# Patient Record
Sex: MALE | Race: WHITE | URBAN - METROPOLITAN AREA
[De-identification: names, ages, dates, MRNs, and addresses within clinical notes are randomized per-mention and may not be internally consistent; named-entity substitution may affect disease eponyms.]

---

## 2023-06-14 ENCOUNTER — OFFICE VISIT (OUTPATIENT)
Dept: URBAN - METROPOLITAN AREA CLINIC 54 | Facility: CLINIC | Age: 23
End: 2023-06-14
Payer: COMMERCIAL

## 2023-06-14 ENCOUNTER — WEB ENCOUNTER (OUTPATIENT)
Dept: URBAN - METROPOLITAN AREA CLINIC 54 | Facility: CLINIC | Age: 23
End: 2023-06-14

## 2023-06-14 VITALS
DIASTOLIC BLOOD PRESSURE: 91 MMHG | HEART RATE: 108 BPM | BODY MASS INDEX: 34.93 KG/M2 | HEIGHT: 74 IN | TEMPERATURE: 97.6 F | SYSTOLIC BLOOD PRESSURE: 141 MMHG | WEIGHT: 272.2 LBS

## 2023-06-14 DIAGNOSIS — R10.32 ABDOMINAL CRAMPING IN LEFT LOWER QUADRANT: ICD-10-CM

## 2023-06-14 DIAGNOSIS — R10.12 ABDOMINAL BURNING SENSATION IN LEFT UPPER QUADRANT: ICD-10-CM

## 2023-06-14 DIAGNOSIS — R74.8 ABNORMAL LIVER ENZYMES: ICD-10-CM

## 2023-06-14 DIAGNOSIS — R19.7 ACUTE DIARRHEA: ICD-10-CM

## 2023-06-14 PROCEDURE — 99204 OFFICE O/P NEW MOD 45 MIN: CPT | Performed by: INTERNAL MEDICINE

## 2023-06-14 PROCEDURE — 99244 OFF/OP CNSLTJ NEW/EST MOD 40: CPT | Performed by: INTERNAL MEDICINE

## 2023-06-14 RX ORDER — COLESTIPOL HYDROCHLORIDE 1 G/1
2 TABLETS TABLET, FILM COATED ORAL ONCE A DAY
Qty: 60 | Refills: 2 | OUTPATIENT
Start: 2023-06-14

## 2023-06-14 RX ORDER — CHOLESTYRAMINE 4 G/9G
1 PACKET MIXED WITH WATER OR NON-CARBONATED DRINK POWDER, FOR SUSPENSION ORAL ONCE A DAY
OUTPATIENT

## 2023-06-14 RX ORDER — CHOLESTYRAMINE 4 G/9G
1 PACKET MIXED WITH WATER OR NON-CARBONATED DRINK POWDER, FOR SUSPENSION ORAL ONCE A DAY
Status: ACTIVE | COMMUNITY

## 2023-06-14 RX ORDER — AMLODIPINE BESYLATE 5 MG/1
1 TABLET TABLET ORAL ONCE A DAY
Status: ACTIVE | COMMUNITY

## 2023-06-14 RX ORDER — ALLOPURINOL 100 MG/1
1 TABLET TABLET ORAL ONCE A DAY
Status: ACTIVE | COMMUNITY

## 2023-06-14 RX ORDER — DICYCLOMINE HYDROCHLORIDE 10 MG/1
2 CAPSULES CAPSULE ORAL THREE TIMES A DAY
Status: ACTIVE | COMMUNITY

## 2023-06-14 NOTE — HPI-TODAY'S VISIT:
6/14/23: Patient was referred by SUDEEP Ny for IBS-M. A copy of this document will be sent to the provider. Pt is a 23 yo male with PMH of HTN, asthma, gout, and IBS who reports several years of intermittent abd pain and diarrhea that has worsened to daily symptoms over the last 6 months. Having 6-7 loose BMs daily. Occasionaly will see minimal BRBPR on the toilet paper. No melena or mucus. Reports intermittent, sharp lower abdominal pain that lasts a few hours at a time, occurring about once a week or so. Usually worsens with defecation. Becomes food avoidant when having pain. Usually Pepto Bismol helps. Prescribed Questran but could not tolerate the taste. No nausea, vomiting, or unintentional weight loss. Mother has celiac disease. No family hx of IBD or CRC. No prior EGD, cscope, or other GI workup. Recent labs showed elevated WBC count at 11.1, TB 1.4, and ALT 73, with AST 32. No imaging.

## 2023-06-15 LAB
ALBUMIN/GLOBULIN RATIO: 1.7
ALBUMIN: 5
ALKALINE PHOSPHATASE: 53
ALT: 59
AST: 28
BILIRUBIN, DIRECT: 0.1
BILIRUBIN, INDIRECT: 0.3
BILIRUBIN, TOTAL: 0.4
C-REACTIVE PROTEIN, QUANT: 5
FIB 4 INDEX: 0.23
FIB 4 INTERPRETATION: (no result)
GLOBULIN: 2.9
IMMUNOGLOBULIN A, QN, SERUM: 165
PLATELET COUNT: 369
PROTEIN, TOTAL: 7.9
T-TRANSGLUTAMINASE (TTG) IGA: <1

## 2023-06-16 ENCOUNTER — LAB OUTSIDE AN ENCOUNTER (OUTPATIENT)
Dept: URBAN - METROPOLITAN AREA CLINIC 54 | Facility: CLINIC | Age: 23
End: 2023-06-16

## 2023-06-16 ENCOUNTER — TELEPHONE ENCOUNTER (OUTPATIENT)
Dept: URBAN - METROPOLITAN AREA CLINIC 54 | Facility: CLINIC | Age: 23
End: 2023-06-16

## 2023-06-28 LAB
FERRITIN, SERUM: 165
HBSAG SCREEN: (no result)
HEP A AB, IGM: (no result)
HEP B CORE AB, IGM: (no result)
HEPATITIS C ANTIBODY: (no result)
IRON BIND.CAP.(TIBC): 315
IRON SATURATION: 24
IRON: 75

## 2023-08-08 ENCOUNTER — OFFICE VISIT (OUTPATIENT)
Dept: URBAN - METROPOLITAN AREA CLINIC 54 | Facility: CLINIC | Age: 23
End: 2023-08-08
Payer: COMMERCIAL

## 2023-08-08 ENCOUNTER — DASHBOARD ENCOUNTERS (OUTPATIENT)
Age: 23
End: 2023-08-08

## 2023-08-08 VITALS
WEIGHT: 264.6 LBS | BODY MASS INDEX: 33.96 KG/M2 | TEMPERATURE: 97.2 F | DIASTOLIC BLOOD PRESSURE: 100 MMHG | SYSTOLIC BLOOD PRESSURE: 138 MMHG | HEART RATE: 81 BPM | HEIGHT: 74 IN

## 2023-08-08 DIAGNOSIS — R74.8 ABNORMAL LIVER ENZYMES: ICD-10-CM

## 2023-08-08 DIAGNOSIS — K76.0 FATTY LIVER: ICD-10-CM

## 2023-08-08 DIAGNOSIS — K58.0 IRRITABLE BOWEL SYNDROME WITH DIARRHEA: ICD-10-CM

## 2023-08-08 PROBLEM — 197125005: Status: ACTIVE | Noted: 2023-08-08

## 2023-08-08 PROBLEM — 197321007: Status: ACTIVE | Noted: 2023-08-08

## 2023-08-08 PROCEDURE — 99213 OFFICE O/P EST LOW 20 MIN: CPT

## 2023-08-08 RX ORDER — COLESTIPOL HYDROCHLORIDE 1 G/1
2 TABLETS TABLET, FILM COATED ORAL ONCE A DAY
Qty: 60 | Refills: 2 | Status: ACTIVE | COMMUNITY
Start: 2023-06-14

## 2023-08-08 RX ORDER — COLESTIPOL HYDROCHLORIDE 1 G/1
2 TABLETS TABLET, FILM COATED ORAL ONCE A DAY
OUTPATIENT
Start: 2023-06-14

## 2023-08-08 RX ORDER — ALLOPURINOL 100 MG/1
1 TABLET TABLET ORAL ONCE A DAY
Status: ACTIVE | COMMUNITY

## 2023-08-08 RX ORDER — DICYCLOMINE HYDROCHLORIDE 20 MG/1
1 TABLET TABLET ORAL
Qty: 90 | Refills: 1

## 2023-08-08 RX ORDER — DICYCLOMINE HYDROCHLORIDE 10 MG/1
2 CAPSULES CAPSULE ORAL THREE TIMES A DAY
Status: ACTIVE | COMMUNITY

## 2023-08-08 NOTE — HPI-TODAY'S VISIT:
6/14/23: Patient was referred by SUDEEP Ny for IBS-M. A copy of this document will be sent to the provider. Pt is a 21 yo male with PMH of HTN, asthma, gout, and IBS who reports several years of intermittent abd pain and diarrhea that has worsened to daily symptoms over the last 6 months. Having 6-7 loose BMs daily. Occasionaly will see minimal BRBPR on the toilet paper. No melena or mucus. Reports intermittent, sharp lower abdominal pain that lasts a few hours at a time, occurring about once a week or so. Usually worsens with defecation. Becomes food avoidant when having pain. Usually Pepto Bismol helps. Prescribed Questran but could not tolerate the taste. No nausea, vomiting, or unintentional weight loss. Mother has celiac disease. No family hx of IBD or CRC. No prior EGD, cscope, or other GI workup. Recent labs showed elevated WBC count at 11.1, TB 1.4, and ALT 73, with AST 32. No imaging.  8/8/23: Pt RTC for follow up of diarrhea and abd pain which have both improved overall. Now abd pain is very infrequent. Not using Bentyl. Stool frequency has improved significantly to 0-2 BMs/day, however pt states stool is still loser than normal. Taking Colestipol 1 tablet every few days because taking as directed caused constipation. Reports a lot of life stress which he feels is related. Normal CRP and negative celiac studies. Pt did not bring back stool tests citing a weak stomach. LFTs improved to ALT of 56, normal TB. FIB4 is normal at 0.23. Negative acute hep panel, normal iron studies. RUQ US was ordered but pt did not schedule as he has known fatty liver.